# Patient Record
Sex: MALE | Race: WHITE | URBAN - METROPOLITAN AREA
[De-identification: names, ages, dates, MRNs, and addresses within clinical notes are randomized per-mention and may not be internally consistent; named-entity substitution may affect disease eponyms.]

---

## 2021-04-28 ENCOUNTER — OUT OF OFFICE VISIT (OUTPATIENT)
Dept: URBAN - METROPOLITAN AREA MEDICAL CENTER 25 | Facility: MEDICAL CENTER | Age: 81
End: 2021-04-28
Payer: COMMERCIAL

## 2021-04-28 DIAGNOSIS — R74.8 ABNORMAL ALKALINE PHOSPHATASE TEST: ICD-10-CM

## 2021-04-28 DIAGNOSIS — R17 CHOLESTATIC JAUNDICE: ICD-10-CM

## 2021-04-28 DIAGNOSIS — I48.91 A-FIB: ICD-10-CM

## 2021-04-28 PROCEDURE — 99221 1ST HOSP IP/OBS SF/LOW 40: CPT | Performed by: INTERNAL MEDICINE

## 2021-04-28 PROCEDURE — G8427 DOCREV CUR MEDS BY ELIG CLIN: HCPCS | Performed by: INTERNAL MEDICINE

## 2021-04-29 ENCOUNTER — OUT OF OFFICE VISIT (OUTPATIENT)
Dept: URBAN - METROPOLITAN AREA MEDICAL CENTER 25 | Facility: MEDICAL CENTER | Age: 81
End: 2021-04-29
Payer: COMMERCIAL

## 2021-04-29 DIAGNOSIS — R17 CHOLESTATIC JAUNDICE: ICD-10-CM

## 2021-04-29 DIAGNOSIS — R74.8 ABNORMAL ALKALINE PHOSPHATASE TEST: ICD-10-CM

## 2021-04-29 PROCEDURE — 99232 SBSQ HOSP IP/OBS MODERATE 35: CPT | Performed by: INTERNAL MEDICINE

## 2021-06-09 ENCOUNTER — OFFICE VISIT (OUTPATIENT)
Dept: URBAN - METROPOLITAN AREA CLINIC 19 | Facility: CLINIC | Age: 81
End: 2021-06-09
Payer: COMMERCIAL

## 2021-06-09 VITALS
TEMPERATURE: 98.6 F | BODY MASS INDEX: 23.62 KG/M2 | WEIGHT: 165 LBS | HEIGHT: 70 IN | SYSTOLIC BLOOD PRESSURE: 120 MMHG | DIASTOLIC BLOOD PRESSURE: 60 MMHG

## 2021-06-09 DIAGNOSIS — K76.9 HEPATOPATHY: ICD-10-CM

## 2021-06-09 DIAGNOSIS — D64.9 ANEMIA, UNSPECIFIED TYPE: ICD-10-CM

## 2021-06-09 PROBLEM — 235856003: Status: ACTIVE | Noted: 2021-06-09

## 2021-06-09 PROCEDURE — 99214 OFFICE O/P EST MOD 30 MIN: CPT | Performed by: INTERNAL MEDICINE

## 2021-06-09 NOTE — HPI-ZZZ
Mr. Chaves is a 81 year-old male who presents today following up from an admission at [facility] from 4/27-5/2/21 for shortness of breath and elevated bili with jaundice. He has CHF and a pacemaker.     He had elevation in LFTs/direct hyperbilirubinemia- thought to be congestive hepatopathy. CT showed normal enhancing liver witout biliary ductal dilation, gallbladder mildly distended, trace ascites in the upper abdomen adjacent to liver edge, relative pacreatic atrophy, large amount of stool in the colon. Hepatitis profile negative.   Labs in ER- wbc 5.3, hgb 11.4, hct 35.1, mcv 73.3, bili 4.1, alk phos 115, AST 50, ALT 29, ammonia 84, lipase 10, troponin 0.065  He reports he saw his cardiologist yesterday who advised him to continue his diuretics, but take them at night. He is feeling well since he left the hospital. He is feeling less jaudiced from when he was in the hospital. He does report food has not been tasting normal since he left the hospital. He also reports he drinks one beer every other day with dinner. He reports he took lactulose twice a day for two weeks after he was discharged. He reports clear mentation.   He reports his last labs were drawn right after he left the hospital and his hgb was low (8.9) and his liver enzymes (bili 1.5) were borderline. His referral notes GI bleed/liver follow up  He reports he is always short of breath and fatigued. He fell twice after leaving hospital. He denies blood in stool/black stool. He takes aspirin and eliquis. No NSAIDs. No abdominal pain, but feeling distended. He reports his last colon was 8 years ago. He has never had an EGD. He reports he has had polyps before. No family history of colon cancer/polyps. No history of GI bleed. He does have hemorrhoids. 2 soft and formed bowel movements a day.   He has a pacemaker, which was implanted on 4/5/21. No kidney disease, diabetes, or home O2. He reports he was just started on iron pills and zinc. He sees Dr. Cuenca, cardiology.

## 2021-06-09 NOTE — PHYSICAL EXAM GASTROINTESTINAL
Abdomen, soft, nontender, mildly distended, no guarding or rigidity, no masses palpable, normal bowel sounds, Liver and Spleen, no hepatomegaly present, no hepatosplenomegaly,  Rectal, deferred

## 2021-06-10 LAB
A/G RATIO: 1.5
ALBUMIN: 3.7
ALKALINE PHOSPHATASE: 147
ALT (SGPT): 28
AST (SGOT): 30
BILIRUBIN, TOTAL: 3
BUN/CREATININE RATIO: 30
BUN: 32
CALCIUM: 8.7
CARBON DIOXIDE, TOTAL: 26
CHLORIDE: 101
CREATININE: 1.08
EGFR IF AFRICN AM: 74
EGFR IF NONAFRICN AM: 64
FERRITIN, SERUM: 709
GLOBULIN, TOTAL: 2.5
GLUCOSE: 74
HEMATOCRIT: 34.8
HEMOGLOBIN: 10.3
INR: 1.2
IRON BIND.CAP.(TIBC): 218
IRON SATURATION: 82
IRON: 178
MCH: 24.5
MCHC: 29.6
MCV: 83
NRBC: 3
PLATELETS: 308
POTASSIUM: 4.7
PROTEIN, TOTAL: 6.2
PROTHROMBIN TIME: 12.9
RBC: 4.2
RDW: 19.9
SODIUM: 141
UIBC: 40
WBC: 5.8

## 2021-06-15 ENCOUNTER — LAB OUTSIDE AN ENCOUNTER (OUTPATIENT)
Dept: URBAN - METROPOLITAN AREA CLINIC 19 | Facility: CLINIC | Age: 81
End: 2021-06-15

## 2021-06-15 ENCOUNTER — TELEPHONE ENCOUNTER (OUTPATIENT)
Dept: URBAN - METROPOLITAN AREA CLINIC 19 | Facility: CLINIC | Age: 81
End: 2021-06-15

## 2021-06-15 PROBLEM — 87522002: Status: ACTIVE | Noted: 2021-06-15

## 2021-06-21 ENCOUNTER — TELEPHONE ENCOUNTER (OUTPATIENT)
Dept: URBAN - METROPOLITAN AREA CLINIC 19 | Facility: CLINIC | Age: 81
End: 2021-06-21

## 2021-09-30 ENCOUNTER — OFFICE VISIT (OUTPATIENT)
Dept: URBAN - METROPOLITAN AREA CLINIC 19 | Facility: CLINIC | Age: 81
End: 2021-09-30
Payer: COMMERCIAL

## 2021-09-30 ENCOUNTER — WEB ENCOUNTER (OUTPATIENT)
Dept: URBAN - METROPOLITAN AREA CLINIC 19 | Facility: CLINIC | Age: 81
End: 2021-09-30

## 2021-09-30 ENCOUNTER — DASHBOARD ENCOUNTERS (OUTPATIENT)
Age: 81
End: 2021-09-30

## 2021-09-30 DIAGNOSIS — K62.1 RECTAL POLYP: ICD-10-CM

## 2021-09-30 DIAGNOSIS — K64.8 INTERNAL HEMORRHOID: ICD-10-CM

## 2021-09-30 PROCEDURE — 46221 LIGATION OF HEMORRHOID(S): CPT | Performed by: INTERNAL MEDICINE

## 2021-10-07 ENCOUNTER — TELEPHONE ENCOUNTER (OUTPATIENT)
Dept: URBAN - METROPOLITAN AREA CLINIC 19 | Facility: CLINIC | Age: 81
End: 2021-10-07

## 2021-11-04 ENCOUNTER — OUT OF OFFICE VISIT (OUTPATIENT)
Dept: URBAN - METROPOLITAN AREA MEDICAL CENTER 25 | Facility: MEDICAL CENTER | Age: 81
End: 2021-11-04
Payer: COMMERCIAL

## 2021-11-04 DIAGNOSIS — R17 CHOLESTATIC JAUNDICE: ICD-10-CM

## 2021-11-04 DIAGNOSIS — I50.9 ACUTE CONGESTIVE HEART FAILURE, UNSPECIFIED CONGESTIVE HEART FAILURE TYPE: ICD-10-CM

## 2021-11-04 DIAGNOSIS — D50.9 ANEMIA: ICD-10-CM

## 2021-11-04 DIAGNOSIS — R74.8 ABNORMAL ALKALINE PHOSPHATASE TEST: ICD-10-CM

## 2021-11-04 PROCEDURE — 99223 1ST HOSP IP/OBS HIGH 75: CPT | Performed by: INTERNAL MEDICINE

## 2021-11-04 PROCEDURE — G8427 DOCREV CUR MEDS BY ELIG CLIN: HCPCS | Performed by: INTERNAL MEDICINE

## 2021-11-05 ENCOUNTER — LAB OUTSIDE AN ENCOUNTER (OUTPATIENT)
Dept: URBAN - METROPOLITAN AREA CLINIC 19 | Facility: CLINIC | Age: 81
End: 2021-11-05

## 2021-11-06 ENCOUNTER — OUT OF OFFICE VISIT (OUTPATIENT)
Dept: URBAN - METROPOLITAN AREA MEDICAL CENTER 25 | Facility: MEDICAL CENTER | Age: 81
End: 2021-11-06
Payer: COMMERCIAL

## 2021-11-06 DIAGNOSIS — R93.3 ABN FINDINGS-GI TRACT: ICD-10-CM

## 2021-11-06 DIAGNOSIS — K92.1 ACUTE MELENA: ICD-10-CM

## 2021-11-06 DIAGNOSIS — I48.91 A-FIB: ICD-10-CM

## 2021-11-06 DIAGNOSIS — D62 ABLA (ACUTE BLOOD LOSS ANEMIA): ICD-10-CM

## 2021-11-06 PROCEDURE — 99232 SBSQ HOSP IP/OBS MODERATE 35: CPT | Performed by: INTERNAL MEDICINE

## 2021-11-08 ENCOUNTER — OUT OF OFFICE VISIT (OUTPATIENT)
Dept: URBAN - METROPOLITAN AREA MEDICAL CENTER 25 | Facility: MEDICAL CENTER | Age: 81
End: 2021-11-08
Payer: COMMERCIAL

## 2021-11-08 DIAGNOSIS — R93.3 ABN FINDINGS-GI TRACT: ICD-10-CM

## 2021-11-08 DIAGNOSIS — R74.8 ABNORMAL ALKALINE PHOSPHATASE TEST: ICD-10-CM

## 2021-11-08 DIAGNOSIS — K59.09 CHANGE IN BOWEL MOVEMENTS INTERMITTENT CONSTIPATION. URGENCY IN THE MORNING.: ICD-10-CM

## 2021-11-08 DIAGNOSIS — D50.9 ANEMIA: ICD-10-CM

## 2021-11-08 PROCEDURE — 99232 SBSQ HOSP IP/OBS MODERATE 35: CPT | Performed by: INTERNAL MEDICINE

## 2021-11-16 ENCOUNTER — OFFICE VISIT (OUTPATIENT)
Dept: URBAN - METROPOLITAN AREA MEDICAL CENTER 25 | Facility: MEDICAL CENTER | Age: 81
End: 2021-11-16